# Patient Record
Sex: FEMALE | Race: WHITE | URBAN - METROPOLITAN AREA
[De-identification: names, ages, dates, MRNs, and addresses within clinical notes are randomized per-mention and may not be internally consistent; named-entity substitution may affect disease eponyms.]

---

## 2019-03-20 ENCOUNTER — OFFICE VISIT (OUTPATIENT)
Dept: URBAN - METROPOLITAN AREA CLINIC 76 | Facility: CLINIC | Age: 79
End: 2019-03-20
Payer: MEDICARE

## 2019-03-20 DIAGNOSIS — H40.013 OPEN ANGLE WITH BORDERLINE FINDINGS, LOW RISK, BILATERAL: ICD-10-CM

## 2019-03-20 DIAGNOSIS — H43.813 VITREOUS DEGENERATION, BILATERAL: Primary | ICD-10-CM

## 2019-03-20 DIAGNOSIS — H25.13 NUCLEAR SCLEROSIS CATARACT, BILATERAL: ICD-10-CM

## 2019-03-20 PROCEDURE — 92004 COMPRE OPH EXAM NEW PT 1/>: CPT | Performed by: OPTOMETRIST

## 2019-03-20 ASSESSMENT — KERATOMETRY
OS: 41.13
OD: 41.63

## 2019-03-20 ASSESSMENT — INTRAOCULAR PRESSURE
OS: 21
OD: 23

## 2019-03-20 NOTE — IMPRESSION/PLAN
Impression: Open angle with borderline findings, low risk, bilateral: H40.013. Lg CDs and high-norm IOPs. Pt reports prev glc testing was normal. Plan: GLC: Discussed condition in detail. Further testing needed. Schedule OCT of optic nerve, Visual Field 24-2, and pachymetry next available. Pt will be returning to [place] for further testing.

## 2019-03-20 NOTE — IMPRESSION/PLAN
Impression: Vitreous degeneration, bilateral: H43.813. OS new. with small hole w/operculum nasally OS. Plan: Posterior vitreous detachment accounts for the patient's complaints. There is no evidence of associated retinal pathology. All signs and risks of retinal detachment and tears were discussed. Patient instructed to call the office immediately if any symptoms noted.

## 2019-03-20 NOTE — IMPRESSION/PLAN
Impression: Nuclear sclerosis cataract, bilateral: H25.13. OD worse than OS. Plan: Cataracts affecting vision some. May need to consider cataract surgery OD>OS if vision problematic. The patient will monitor vision changes and contact us with any decrease in vision. Continue to monitor.